# Patient Record
Sex: MALE | Race: ASIAN | Employment: FULL TIME | URBAN - METROPOLITAN AREA
[De-identification: names, ages, dates, MRNs, and addresses within clinical notes are randomized per-mention and may not be internally consistent; named-entity substitution may affect disease eponyms.]

---

## 2019-05-23 ENCOUNTER — OFFICE VISIT (OUTPATIENT)
Dept: FAMILY MEDICINE CLINIC | Facility: CLINIC | Age: 29
End: 2019-05-23
Payer: COMMERCIAL

## 2019-05-23 ENCOUNTER — APPOINTMENT (OUTPATIENT)
Dept: LAB | Age: 29
End: 2019-05-23
Attending: FAMILY MEDICINE
Payer: COMMERCIAL

## 2019-05-23 VITALS
WEIGHT: 267 LBS | SYSTOLIC BLOOD PRESSURE: 130 MMHG | HEIGHT: 74 IN | BODY MASS INDEX: 34.27 KG/M2 | OXYGEN SATURATION: 98 % | DIASTOLIC BLOOD PRESSURE: 80 MMHG | HEART RATE: 55 BPM | RESPIRATION RATE: 18 BRPM

## 2019-05-23 DIAGNOSIS — E55.9 HYPOVITAMINOSIS D: ICD-10-CM

## 2019-05-23 DIAGNOSIS — R00.1 JUNCTIONAL BRADYCARDIA: ICD-10-CM

## 2019-05-23 DIAGNOSIS — Z00.00 ROUTINE GENERAL MEDICAL EXAMINATION AT A HEALTH CARE FACILITY: Primary | ICD-10-CM

## 2019-05-23 DIAGNOSIS — Z00.00 ROUTINE GENERAL MEDICAL EXAMINATION AT A HEALTH CARE FACILITY: ICD-10-CM

## 2019-05-23 DIAGNOSIS — R00.1 BRADYCARDIA WITH 51-60 BEATS PER MINUTE: ICD-10-CM

## 2019-05-23 PROCEDURE — 82607 VITAMIN B-12: CPT | Performed by: FAMILY MEDICINE

## 2019-05-23 PROCEDURE — 84443 ASSAY THYROID STIM HORMONE: CPT | Performed by: FAMILY MEDICINE

## 2019-05-23 PROCEDURE — 99385 PREV VISIT NEW AGE 18-39: CPT | Performed by: FAMILY MEDICINE

## 2019-05-23 PROCEDURE — 84439 ASSAY OF FREE THYROXINE: CPT | Performed by: FAMILY MEDICINE

## 2019-05-23 PROCEDURE — 36415 COLL VENOUS BLD VENIPUNCTURE: CPT | Performed by: FAMILY MEDICINE

## 2019-05-23 PROCEDURE — 82306 VITAMIN D 25 HYDROXY: CPT | Performed by: FAMILY MEDICINE

## 2019-05-23 PROCEDURE — 93000 ELECTROCARDIOGRAM COMPLETE: CPT | Performed by: FAMILY MEDICINE

## 2019-05-23 NOTE — PROGRESS NOTES
HPI:  Here for a physical.    PAST MEDICAL HISTORY:  Past Medical History:   Diagnosis Date   • Hypothyroid    • Hypovitaminosis D      PAST SURGICAL HISTORY:  History reviewed. No pertinent surgical history.   MEDICATIONS:    Current Outpatient Medications file    Social History Narrative      Not on file      REVIEW OF SYSTEMS:  GENERAL: Feeling generally well. EYES: No complaints of diplopia or blurred vision. EARS: No complaints of tinnitus or decreased hearing acuity.   CARDIOVASCULAR: No complaints of lymphadenopathy. No thyromegaly or lesions palpated. CARDIOVASCULAR: regular rhythm. bradycardia NL S1 and S2, no murmurs. Bilateral carotids without bruit. No abdominal bruits auscultated. Bilateral dorsalis pedis and posterior tibial pulses 2+/4+.  No e Patient understands to contact office if unable to do so.

## 2020-04-14 ENCOUNTER — E-VISIT (OUTPATIENT)
Dept: FAMILY MEDICINE CLINIC | Facility: CLINIC | Age: 30
End: 2020-04-14

## 2020-04-14 DIAGNOSIS — J45.21 MILD INTERMITTENT ASTHMA WITH EXACERBATION: Primary | ICD-10-CM

## 2020-04-14 PROCEDURE — 99422 OL DIG E/M SVC 11-20 MIN: CPT | Performed by: NURSE PRACTITIONER

## 2020-04-16 RX ORDER — METHYLPREDNISOLONE 4 MG/1
TABLET ORAL
Qty: 21 TABLET | Refills: 0 | Status: SHIPPED | OUTPATIENT
Start: 2020-04-16 | End: 2021-01-29 | Stop reason: ALTCHOICE

## 2020-04-16 RX ORDER — ALBUTEROL SULFATE 90 UG/1
AEROSOL, METERED RESPIRATORY (INHALATION)
Qty: 1 INHALER | Refills: 0 | Status: SHIPPED | OUTPATIENT
Start: 2020-04-16 | End: 2021-01-29 | Stop reason: ALTCHOICE

## 2020-04-16 NOTE — PROGRESS NOTES
Spoke with patient by phone. Pt with sx for 2 weeks. After jogging outdoors, he developed some chills, nasal gen, and felt weak 2 weeks ago. Lasted 1 day. Pt has had 'tickle cough in his throat' with a sensation that he has phlegm, but does not.   For

## 2020-04-17 ENCOUNTER — PATIENT MESSAGE (OUTPATIENT)
Dept: FAMILY MEDICINE CLINIC | Facility: CLINIC | Age: 30
End: 2020-04-17

## 2021-01-24 ENCOUNTER — HOSPITAL ENCOUNTER (OUTPATIENT)
Age: 31
Discharge: HOME | End: 2021-01-24
Payer: COMMERCIAL

## 2021-01-24 ENCOUNTER — APPOINTMENT (OUTPATIENT)
Dept: GENERAL RADIOLOGY | Age: 31
End: 2021-01-24
Attending: NURSE PRACTITIONER
Payer: COMMERCIAL

## 2021-01-24 VITALS
SYSTOLIC BLOOD PRESSURE: 114 MMHG | TEMPERATURE: 99 F | RESPIRATION RATE: 18 BRPM | OXYGEN SATURATION: 98 % | HEART RATE: 54 BPM | HEIGHT: 75 IN | WEIGHT: 275 LBS | DIASTOLIC BLOOD PRESSURE: 76 MMHG | BODY MASS INDEX: 34.19 KG/M2

## 2021-01-24 DIAGNOSIS — M67.432 GANGLION CYST OF DORSUM OF LEFT WRIST: ICD-10-CM

## 2021-01-24 DIAGNOSIS — R06.00 DYSPNEA, UNSPECIFIED TYPE: Primary | ICD-10-CM

## 2021-01-24 LAB
#MXD IC: 0.7 X10ˆ3/UL (ref 0.1–1)
ATRIAL RATE: 55 BPM
CREAT BLD-MCNC: 1.1 MG/DL
DDIMER WHOLE BLOOD: <200 NG/ML DDU (ref ?–400)
GLUCOSE BLD-MCNC: 73 MG/DL (ref 70–99)
HCT VFR BLD AUTO: 46.6 %
HGB BLD-MCNC: 15.6 G/DL
ISTAT BUN: 16 MG/DL (ref 7–18)
ISTAT CHLORIDE: 103 MMOL/L (ref 98–112)
ISTAT HEMATOCRIT: 48 %
ISTAT IONIZED CALCIUM FOR CHEM 8: 1.33 MMOL/L (ref 1.12–1.32)
ISTAT POTASSIUM: 4 MMOL/L (ref 3.6–5.1)
ISTAT SODIUM: 141 MMOL/L (ref 136–145)
ISTAT TCO2: 28 MMOL/L (ref 21–32)
LYMPHOCYTES # BLD AUTO: 2.4 X10ˆ3/UL (ref 1–4)
LYMPHOCYTES NFR BLD AUTO: 34.4 %
MCH RBC QN AUTO: 29.2 PG (ref 26–34)
MCHC RBC AUTO-ENTMCNC: 33.5 G/DL (ref 31–37)
MCV RBC AUTO: 87.3 FL (ref 80–100)
MIXED CELL %: 9.8 %
NEUTROPHILS # BLD AUTO: 3.9 X10ˆ3/UL (ref 1.5–7.7)
NEUTROPHILS NFR BLD AUTO: 55.8 %
P AXIS: 70 DEGREES
P-R INTERVAL: 146 MS
PLATELET # BLD AUTO: 223 X10ˆ3/UL (ref 150–450)
Q-T INTERVAL: 406 MS
QRS DURATION: 102 MS
QTC CALCULATION (BEZET): 388 MS
R AXIS: 62 DEGREES
RBC # BLD AUTO: 5.34 X10ˆ6/UL
T AXIS: 37 DEGREES
TROPONIN I BLD-MCNC: <0.02 NG/ML
VENTRICULAR RATE: 55 BPM
WBC # BLD AUTO: 7 X10ˆ3/UL (ref 4–11)

## 2021-01-24 PROCEDURE — 99214 OFFICE O/P EST MOD 30 MIN: CPT | Performed by: NURSE PRACTITIONER

## 2021-01-24 PROCEDURE — 84484 ASSAY OF TROPONIN QUANT: CPT | Performed by: NURSE PRACTITIONER

## 2021-01-24 PROCEDURE — 80047 BASIC METABLC PNL IONIZED CA: CPT | Performed by: NURSE PRACTITIONER

## 2021-01-24 PROCEDURE — 85025 COMPLETE CBC W/AUTO DIFF WBC: CPT | Performed by: NURSE PRACTITIONER

## 2021-01-24 PROCEDURE — 71046 X-RAY EXAM CHEST 2 VIEWS: CPT | Performed by: NURSE PRACTITIONER

## 2021-01-24 PROCEDURE — 36415 COLL VENOUS BLD VENIPUNCTURE: CPT | Performed by: NURSE PRACTITIONER

## 2021-01-24 PROCEDURE — 85378 FIBRIN DEGRADE SEMIQUANT: CPT | Performed by: NURSE PRACTITIONER

## 2021-01-24 PROCEDURE — 93000 ELECTROCARDIOGRAM COMPLETE: CPT | Performed by: NURSE PRACTITIONER

## 2021-01-24 RX ORDER — ALBUTEROL SULFATE 90 UG/1
2 AEROSOL, METERED RESPIRATORY (INHALATION) EVERY 4 HOURS PRN
Qty: 1 INHALER | Refills: 0 | Status: SHIPPED | OUTPATIENT
Start: 2021-01-24 | End: 2021-02-23

## 2021-01-24 RX ORDER — METHYLPREDNISOLONE 4 MG/1
TABLET ORAL
Qty: 1 PACKAGE | Refills: 0 | Status: SHIPPED | OUTPATIENT
Start: 2021-01-24 | End: 2021-05-21 | Stop reason: ALTCHOICE

## 2021-01-24 NOTE — ED INITIAL ASSESSMENT (HPI)
C/O 3 day hx of feeling some shortness of breath while at rest, making him feel like he has to take a deeper breath. He denies shortness of breath with activity, just when he sits idly. Denies chest pain with deep inspiration. Denies fever or chills.   R

## 2021-01-24 NOTE — ED PROVIDER NOTES
Patient Seen in: [facility]mediate [address]      History   Patient presents with:  Difficulty Breathing  Arm or Hand Injury    Stated Complaint: shortness of breath x 3 days    HPI/Subjective:   29-year-old male presents the immediate care for short Ht 190.5 cm (6' 3\")   Wt 124.7 kg   SpO2 98%   BMI 34.37 kg/m²         Physical Exam  Vitals signs and nursing note reviewed. Constitutional:       General: He is not in acute distress. Appearance: Normal appearance. He is not ill-appearing.    HENT: 01/24/2021         HEART Score: 1        Risk of adverse cardiac event is 0.9-1.7%              Xr Chest Pa + Lat Chest (cpt=71046)    Result Date: 1/24/2021  PROCEDURE:  XR CHEST PA + LAT CHEST (CPT=71046)  INDICATIONS:  shortness of breath x 3 days  COMP or admission on an emergency basis. Comprehensive verbal and written discharge and follow-up instructions were provided to help prevent relapse or worsening. I discussed the case with the patient and they had no questions, complaints, or concerns.   Melissa

## 2021-01-29 ENCOUNTER — LAB ENCOUNTER (OUTPATIENT)
Dept: LAB | Age: 31
End: 2021-01-29
Attending: FAMILY MEDICINE
Payer: COMMERCIAL

## 2021-01-29 ENCOUNTER — OFFICE VISIT (OUTPATIENT)
Dept: FAMILY MEDICINE CLINIC | Facility: CLINIC | Age: 31
End: 2021-01-29
Payer: COMMERCIAL

## 2021-01-29 VITALS
HEIGHT: 74.5 IN | RESPIRATION RATE: 18 BRPM | DIASTOLIC BLOOD PRESSURE: 80 MMHG | OXYGEN SATURATION: 98 % | SYSTOLIC BLOOD PRESSURE: 120 MMHG | BODY MASS INDEX: 36.44 KG/M2 | HEART RATE: 51 BPM | WEIGHT: 287 LBS

## 2021-01-29 DIAGNOSIS — M67.432 GANGLION CYST OF VOLAR ASPECT OF LEFT WRIST: ICD-10-CM

## 2021-01-29 DIAGNOSIS — E03.9 HYPOTHYROIDISM, UNSPECIFIED TYPE: Primary | ICD-10-CM

## 2021-01-29 DIAGNOSIS — Z13.220 SCREENING, LIPID: ICD-10-CM

## 2021-01-29 DIAGNOSIS — E55.9 HYPOVITAMINOSIS D: ICD-10-CM

## 2021-01-29 DIAGNOSIS — E03.9 HYPOTHYROIDISM, UNSPECIFIED TYPE: ICD-10-CM

## 2021-01-29 LAB
CHOLEST SMN-MCNC: 204 MG/DL (ref ?–200)
HDLC SERPL-MCNC: 49 MG/DL (ref 40–59)
LDLC SERPL CALC-MCNC: 140 MG/DL (ref ?–100)
NONHDLC SERPL-MCNC: 155 MG/DL (ref ?–130)
PATIENT FASTING Y/N/NP: YES
T4 FREE SERPL-MCNC: 1 NG/DL (ref 0.8–1.7)
TRIGL SERPL-MCNC: 76 MG/DL (ref 30–149)
TSI SER-ACNC: 4.68 MIU/ML (ref 0.36–3.74)
VIT D+METAB SERPL-MCNC: 12.7 NG/ML (ref 30–100)
VLDLC SERPL CALC-MCNC: 15 MG/DL (ref 0–30)

## 2021-01-29 PROCEDURE — 84443 ASSAY THYROID STIM HORMONE: CPT

## 2021-01-29 PROCEDURE — 3008F BODY MASS INDEX DOCD: CPT | Performed by: FAMILY MEDICINE

## 2021-01-29 PROCEDURE — 3079F DIAST BP 80-89 MM HG: CPT | Performed by: FAMILY MEDICINE

## 2021-01-29 PROCEDURE — 3074F SYST BP LT 130 MM HG: CPT | Performed by: FAMILY MEDICINE

## 2021-01-29 PROCEDURE — 36415 COLL VENOUS BLD VENIPUNCTURE: CPT

## 2021-01-29 PROCEDURE — 84439 ASSAY OF FREE THYROXINE: CPT

## 2021-01-29 PROCEDURE — 80061 LIPID PANEL: CPT

## 2021-01-29 PROCEDURE — 82306 VITAMIN D 25 HYDROXY: CPT

## 2021-01-29 PROCEDURE — 99214 OFFICE O/P EST MOD 30 MIN: CPT | Performed by: FAMILY MEDICINE

## 2021-01-29 NOTE — PROGRESS NOTES
Has had several episodes of shortness of breath each time resolved with methylprednisolone and albuterol. He did have asthma as a child. Most recently was in the urgent care in the last week or so. Feeling better.     Has been on levothyroxine 100 mcg wh Hypovitaminosis D    - VITAMIN D, 25-HYDROXY; Future    3. Screening, lipid    - LIPID PANEL; Future    4.  Ganglion cyst of volar aspect of left wrist    - ORTHOPEDIC - INTERNAL         If this note is coded by time based on the Office/Outpatient Evaluatio

## 2021-01-30 RX ORDER — LEVOTHYROXINE SODIUM 0.03 MG/1
25 TABLET ORAL
Qty: 30 TABLET | Refills: 2 | Status: SHIPPED | OUTPATIENT
Start: 2021-01-30 | End: 2021-03-22

## 2021-01-30 RX ORDER — ERGOCALCIFEROL 1.25 MG/1
50000 CAPSULE ORAL
Qty: 12 CAPSULE | Refills: 0 | Status: SHIPPED | OUTPATIENT
Start: 2021-01-30 | End: 2021-05-21 | Stop reason: ALTCHOICE

## 2021-01-31 ENCOUNTER — PATIENT MESSAGE (OUTPATIENT)
Dept: FAMILY MEDICINE CLINIC | Facility: CLINIC | Age: 31
End: 2021-01-31

## 2021-02-01 NOTE — TELEPHONE ENCOUNTER
From: Laya Chun  To: Sabrina Cee MD  Sent: 1/31/2021 12:30 AM CST  Subject: Test Results Question    Hi Doctor,  Did you already prescribe Vitamin D and New dosage of thyroid?

## 2021-03-22 RX ORDER — LEVOTHYROXINE SODIUM 0.03 MG/1
25 TABLET ORAL
Qty: 30 TABLET | Refills: 2 | Status: SHIPPED | OUTPATIENT
Start: 2021-03-22 | End: 2021-04-23

## 2021-04-17 DIAGNOSIS — E55.9 HYPOVITAMINOSIS D: Primary | ICD-10-CM

## 2021-04-17 DIAGNOSIS — E03.9 HYPOTHYROIDISM, UNSPECIFIED TYPE: ICD-10-CM

## 2021-04-19 ENCOUNTER — PATIENT MESSAGE (OUTPATIENT)
Dept: FAMILY MEDICINE CLINIC | Facility: CLINIC | Age: 31
End: 2021-04-19

## 2021-04-19 RX ORDER — ERGOCALCIFEROL 1.25 MG/1
50000 CAPSULE ORAL
Qty: 12 CAPSULE | Refills: 0 | OUTPATIENT
Start: 2021-04-19

## 2021-04-23 ENCOUNTER — PATIENT MESSAGE (OUTPATIENT)
Dept: FAMILY MEDICINE CLINIC | Facility: CLINIC | Age: 31
End: 2021-04-23

## 2021-04-23 ENCOUNTER — LABORATORY ENCOUNTER (OUTPATIENT)
Dept: LAB | Age: 31
End: 2021-04-23
Attending: FAMILY MEDICINE
Payer: COMMERCIAL

## 2021-04-23 DIAGNOSIS — E03.9 HYPOTHYROIDISM, UNSPECIFIED TYPE: ICD-10-CM

## 2021-04-23 PROCEDURE — 82306 VITAMIN D 25 HYDROXY: CPT | Performed by: FAMILY MEDICINE

## 2021-04-23 PROCEDURE — 84443 ASSAY THYROID STIM HORMONE: CPT | Performed by: FAMILY MEDICINE

## 2021-04-23 PROCEDURE — 84439 ASSAY OF FREE THYROXINE: CPT | Performed by: FAMILY MEDICINE

## 2021-04-23 RX ORDER — LEVOTHYROXINE SODIUM 0.05 MG/1
50 TABLET ORAL
Qty: 30 TABLET | Refills: 2 | Status: SHIPPED | OUTPATIENT
Start: 2021-04-23 | End: 2021-06-24

## 2021-04-26 NOTE — TELEPHONE ENCOUNTER
Pt finished Vitamin D 50,000 x 12 weeks and just had lab work done on 4/23/21. Vitamin D level was 43. 9. Should she continue with OTC? Please advise.

## 2021-04-26 NOTE — TELEPHONE ENCOUNTER
From: [facility]  To: Cesar Horton MD  Sent: 4/23/2021 4:08 PM CDT  Subject: Test Results Question    I have a question about TSH+FREE T4 resulted on 4/23/21, 3:27 PM.    Shall i continue using Vitamin D tablet once every week?    Also, For T

## 2021-05-14 ENCOUNTER — OFFICE VISIT (OUTPATIENT)
Dept: ORTHOPEDICS CLINIC | Facility: CLINIC | Age: 31
End: 2021-05-14
Payer: COMMERCIAL

## 2021-05-14 VITALS — OXYGEN SATURATION: 100 % | HEART RATE: 70 BPM

## 2021-05-14 DIAGNOSIS — M67.432 GANGLION OF LEFT WRIST: Primary | ICD-10-CM

## 2021-05-14 PROCEDURE — 99202 OFFICE O/P NEW SF 15 MIN: CPT | Performed by: ORTHOPAEDIC SURGERY

## 2021-05-14 NOTE — H&P
[facility] Note    CC: Left dorsal ganglion cyst    HPI: This 31year old right-hand-dominant male presents with left dorsal ganglion cyst that was first noticed in January of this year. He has mild pain associated.   Occasionally he will feel twinge Genitourinary: Negative for dysuria, frequency and urgency. Musculoskeletal: Negative for myalgias, neck pain and neck stiffness. Skin: Negative for color change, rash and wound. Allergic/Immunologic: Negative for immunocompromised state.    Neurolog Luis Daniel Ricci MD  Hand, Wrist, & Elbow Surgery  [facility]  [facility] [address] [facility], [address] [place] [address]. org  [email or web address]  t: [phone]  f: [phone]

## 2021-05-21 ENCOUNTER — OFFICE VISIT (OUTPATIENT)
Dept: FAMILY MEDICINE CLINIC | Facility: CLINIC | Age: 31
End: 2021-05-21
Payer: COMMERCIAL

## 2021-05-21 VITALS
RESPIRATION RATE: 18 BRPM | HEART RATE: 77 BPM | OXYGEN SATURATION: 98 % | WEIGHT: 277 LBS | BODY MASS INDEX: 35.17 KG/M2 | DIASTOLIC BLOOD PRESSURE: 80 MMHG | HEIGHT: 74.5 IN | SYSTOLIC BLOOD PRESSURE: 130 MMHG

## 2021-05-21 DIAGNOSIS — S86.112A STRAIN OF GASTROCNEMIUS MUSCLE OF LEFT LOWER EXTREMITY, INITIAL ENCOUNTER: Primary | ICD-10-CM

## 2021-05-21 PROCEDURE — 99213 OFFICE O/P EST LOW 20 MIN: CPT | Performed by: FAMILY MEDICINE

## 2021-05-21 PROCEDURE — 3008F BODY MASS INDEX DOCD: CPT | Performed by: FAMILY MEDICINE

## 2021-05-21 PROCEDURE — 3079F DIAST BP 80-89 MM HG: CPT | Performed by: FAMILY MEDICINE

## 2021-05-21 PROCEDURE — 3075F SYST BP GE 130 - 139MM HG: CPT | Performed by: FAMILY MEDICINE

## 2021-05-21 RX ORDER — NABUMETONE 500 MG/1
500 TABLET, FILM COATED ORAL 2 TIMES DAILY
Qty: 60 TABLET | Refills: 0 | Status: SHIPPED | OUTPATIENT
Start: 2021-05-21 | End: 2021-06-03

## 2021-05-21 NOTE — PATIENT INSTRUCTIONS
I would like you to use the anti-inflammatory twice a day for 2 weeks. Then you may use the leftovers as needed. Ice the leg after cricket matches or other workouts that aggravate the muscle.     Try to sit with the feet on the floor, do not fold the le

## 2021-05-21 NOTE — PROGRESS NOTES
3 to 4 months of pain in the left anterior lateral calf. He is a bowler for cricket. He also works out every other day. There is a lot of jumping involved. He tends to sit with his left leg folded under his right. No radiation down the leg.   No numbne leftovers as needed. Ice the area after each workout including cricket matches. Try to eliminate other stressors on this muscle so that he can continue to play cricket. Try to decrease the jumping and his other workouts.   Do not sit with the leg folded

## 2021-06-02 ENCOUNTER — PATIENT MESSAGE (OUTPATIENT)
Dept: FAMILY MEDICINE CLINIC | Facility: CLINIC | Age: 31
End: 2021-06-02

## 2021-06-02 DIAGNOSIS — S86.112A STRAIN OF GASTROCNEMIUS MUSCLE OF LEFT LOWER EXTREMITY, INITIAL ENCOUNTER: Primary | ICD-10-CM

## 2021-06-03 NOTE — TELEPHONE ENCOUNTER
From: [facility]  To: Berna Garsia MD  Sent: 6/2/2021 9:43 PM CDT  Subject: Prescription Question    Hi Doctor,    I was prescribed to used Nabumetone.     After few days of using it, i experienced prickly heat and itching on chest and back /

## 2021-06-03 NOTE — TELEPHONE ENCOUNTER
Have him stop the medication. Yes added to the allergy list with the side effect of rash. Have him continue Zyrtec 48 more hours and then discontinue. Next week we will need to decide on an alternative for treatment.

## 2021-06-03 NOTE — TELEPHONE ENCOUNTER
Dr. Shanika Fiore, please see adverse reaction to Nabumetone. Please advise on id patient should continue medication?  Add to allergy list?

## 2021-06-08 RX ORDER — TRAMADOL HYDROCHLORIDE 50 MG/1
50 TABLET ORAL EVERY 6 HOURS PRN
Qty: 30 TABLET | Refills: 1 | Status: SHIPPED | OUTPATIENT
Start: 2021-06-08 | End: 2021-07-01

## 2021-06-08 NOTE — TELEPHONE ENCOUNTER
Dr. Sargent, would you like to rx something for pain in place of the Nabumentone pt had to dc? Or appt?

## 2021-06-15 RX ORDER — LEVOTHYROXINE SODIUM 0.03 MG/1
25 TABLET ORAL
Qty: 90 TABLET | Refills: 0 | Status: SHIPPED | OUTPATIENT
Start: 2021-06-15 | End: 2021-06-24 | Stop reason: DRUGHIGH

## 2021-06-16 ENCOUNTER — TELEPHONE (OUTPATIENT)
Dept: FAMILY MEDICINE CLINIC | Facility: CLINIC | Age: 31
End: 2021-06-16

## 2021-06-16 NOTE — TELEPHONE ENCOUNTER
Pt had an appt in January for an exam regarding some SOB. He said this visit was intended to be a physical and the labs and testing he had done are not being covered 100%.   Pls call to discuss

## 2021-06-18 NOTE — TELEPHONE ENCOUNTER
Pt aware, Dr. Fredrick Damon not changing code to prev. Pt seen for hypothyroidism - TSH lab and vit D def - vit D lab. Lipid panel was entered as screening code.

## 2021-06-23 DIAGNOSIS — S86.112A STRAIN OF GASTROCNEMIUS MUSCLE OF LEFT LOWER EXTREMITY, INITIAL ENCOUNTER: ICD-10-CM

## 2021-06-23 RX ORDER — LEVOTHYROXINE SODIUM 0.05 MG/1
50 TABLET ORAL
Qty: 30 TABLET | Refills: 2 | Status: CANCELLED | OUTPATIENT
Start: 2021-06-23 | End: 2021-09-21

## 2021-06-23 RX ORDER — NABUMETONE 500 MG/1
TABLET, FILM COATED ORAL
Qty: 60 TABLET | Refills: 0 | OUTPATIENT
Start: 2021-06-23

## 2021-06-24 RX ORDER — LEVOTHYROXINE SODIUM 0.05 MG/1
50 TABLET ORAL
Qty: 30 TABLET | Refills: 2 | Status: CANCELLED | OUTPATIENT
Start: 2021-06-24 | End: 2021-09-22

## 2021-06-24 NOTE — TELEPHONE ENCOUNTER
Rx Request  Levothyroxine Sodium 50 MCG Oral Tab    Disp:    30                R: 2    Last Refilled: 04/23/2021    Last Visit: 05/21/2021    Protocol Passed?  Yes[  ]       No[ x ]

## 2021-06-24 NOTE — TELEPHONE ENCOUNTER
Rx Request  Levothyroxine Sodium 50 MCG Oral Tab    Disp:     30               R: 2    Last Refilled: 04/23/2021    Last Visit: 05/21/2021    Protocol Passed?  Yes[  ]       No[ x ]  \

## 2021-07-01 RX ORDER — TRAMADOL HYDROCHLORIDE 50 MG/1
50 TABLET ORAL EVERY 6 HOURS PRN
Qty: 30 TABLET | Refills: 1 | Status: SHIPPED | OUTPATIENT
Start: 2021-07-01 | End: 2021-07-02 | Stop reason: ALTCHOICE

## 2021-07-01 NOTE — TELEPHONE ENCOUNTER
Ask him to see Evette Chatman, the sports medicine orthopedist on [address].   May need to consider us or MRI as the pain is now over 4 months

## 2021-07-02 ENCOUNTER — OFFICE VISIT (OUTPATIENT)
Dept: INTERNAL MEDICINE CLINIC | Facility: CLINIC | Age: 31
End: 2021-07-02
Payer: COMMERCIAL

## 2021-07-02 ENCOUNTER — LAB ENCOUNTER (OUTPATIENT)
Dept: LAB | Age: 31
End: 2021-07-02
Attending: FAMILY MEDICINE
Payer: COMMERCIAL

## 2021-07-02 ENCOUNTER — HOSPITAL ENCOUNTER (OUTPATIENT)
Dept: GENERAL RADIOLOGY | Age: 31
Discharge: HOME | End: 2021-07-02
Attending: FAMILY MEDICINE
Payer: COMMERCIAL

## 2021-07-02 VITALS
HEIGHT: 75 IN | RESPIRATION RATE: 16 BRPM | OXYGEN SATURATION: 98 % | HEART RATE: 68 BPM | WEIGHT: 280 LBS | TEMPERATURE: 98 F | BODY MASS INDEX: 34.82 KG/M2 | SYSTOLIC BLOOD PRESSURE: 128 MMHG | DIASTOLIC BLOOD PRESSURE: 72 MMHG

## 2021-07-02 DIAGNOSIS — M22.2X2 PATELLOFEMORAL PAIN SYNDROME OF LEFT KNEE: ICD-10-CM

## 2021-07-02 DIAGNOSIS — G89.29 CHRONIC PAIN OF LEFT KNEE: ICD-10-CM

## 2021-07-02 DIAGNOSIS — M25.562 CHRONIC PAIN OF LEFT KNEE: Primary | ICD-10-CM

## 2021-07-02 DIAGNOSIS — G89.29 CHRONIC PAIN OF LEFT KNEE: Primary | ICD-10-CM

## 2021-07-02 DIAGNOSIS — E03.9 HYPOTHYROIDISM, UNSPECIFIED TYPE: ICD-10-CM

## 2021-07-02 DIAGNOSIS — M25.562 CHRONIC PAIN OF LEFT KNEE: ICD-10-CM

## 2021-07-02 DIAGNOSIS — S86.912A MUSCLE STRAIN OF LEFT LOWER LEG, INITIAL ENCOUNTER: ICD-10-CM

## 2021-07-02 LAB
T4 FREE SERPL-MCNC: 0.9 NG/DL (ref 0.8–1.7)
TSI SER-ACNC: 4.1 MIU/ML (ref 0.36–3.74)

## 2021-07-02 PROCEDURE — 3078F DIAST BP <80 MM HG: CPT | Performed by: FAMILY MEDICINE

## 2021-07-02 PROCEDURE — 84439 ASSAY OF FREE THYROXINE: CPT | Performed by: FAMILY MEDICINE

## 2021-07-02 PROCEDURE — 84443 ASSAY THYROID STIM HORMONE: CPT | Performed by: FAMILY MEDICINE

## 2021-07-02 PROCEDURE — 73564 X-RAY EXAM KNEE 4 OR MORE: CPT | Performed by: FAMILY MEDICINE

## 2021-07-02 PROCEDURE — 99203 OFFICE O/P NEW LOW 30 MIN: CPT | Performed by: FAMILY MEDICINE

## 2021-07-02 PROCEDURE — 3008F BODY MASS INDEX DOCD: CPT | Performed by: FAMILY MEDICINE

## 2021-07-02 PROCEDURE — 3074F SYST BP LT 130 MM HG: CPT | Performed by: FAMILY MEDICINE

## 2021-07-02 NOTE — PROGRESS NOTES
Sports Medicine Patient Note    CC: Patient presents with:  Musculoskeletal Problem: RG rm 2 New ortho pt, left knee/ shin pain x 3-4 months      HPI: Comfort Flowers is 31year old M who presents for chronic intermittent left knee pain that has bee vision  HEENT: denies headache  LUNGS: denies shortness of breath with exertion  CARDIOVASCULAR: denies chest pain on exertion  GI: no nausea or abdominal pain  NEURO: denies numbness, tingling      Physical Exam:    /72   Pulse 68   Temp 98.1 °F (36 activity modifications and additional analgesia with cryotherapy, trial of Aspercreme or Salonpas. We will avoid oral and topical anti-inflammatories given his possible allergic reaction to nabumetone. He will follow-up with me after physical therapy.

## 2021-07-13 ENCOUNTER — TELEPHONE (OUTPATIENT)
Dept: PHYSICAL THERAPY | Facility: HOSPITAL | Age: 31
End: 2021-07-13

## 2021-07-14 ENCOUNTER — APPOINTMENT (OUTPATIENT)
Dept: PHYSICAL THERAPY | Age: 31
End: 2021-07-14
Attending: FAMILY MEDICINE
Payer: COMMERCIAL

## 2021-07-16 ENCOUNTER — APPOINTMENT (OUTPATIENT)
Dept: PHYSICAL THERAPY | Age: 31
End: 2021-07-16
Attending: FAMILY MEDICINE
Payer: COMMERCIAL

## 2021-07-16 ENCOUNTER — TELEPHONE (OUTPATIENT)
Dept: PHYSICAL THERAPY | Age: 31
End: 2021-07-16

## 2021-07-20 ENCOUNTER — APPOINTMENT (OUTPATIENT)
Dept: PHYSICAL THERAPY | Age: 31
End: 2021-07-20
Attending: FAMILY MEDICINE
Payer: COMMERCIAL

## 2021-07-20 ENCOUNTER — TELEPHONE (OUTPATIENT)
Dept: PHYSICAL THERAPY | Age: 31
End: 2021-07-20

## 2021-07-23 ENCOUNTER — APPOINTMENT (OUTPATIENT)
Dept: PHYSICAL THERAPY | Age: 31
End: 2021-07-23
Attending: FAMILY MEDICINE
Payer: COMMERCIAL

## 2021-07-27 ENCOUNTER — TELEPHONE (OUTPATIENT)
Dept: PHYSICAL THERAPY | Facility: HOSPITAL | Age: 31
End: 2021-07-27

## 2021-07-27 ENCOUNTER — APPOINTMENT (OUTPATIENT)
Dept: PHYSICAL THERAPY | Age: 31
End: 2021-07-27
Attending: FAMILY MEDICINE
Payer: COMMERCIAL

## 2021-07-30 ENCOUNTER — APPOINTMENT (OUTPATIENT)
Dept: PHYSICAL THERAPY | Age: 31
End: 2021-07-30
Attending: FAMILY MEDICINE
Payer: COMMERCIAL

## 2021-08-03 ENCOUNTER — APPOINTMENT (OUTPATIENT)
Dept: PHYSICAL THERAPY | Age: 31
End: 2021-08-03
Attending: FAMILY MEDICINE
Payer: COMMERCIAL

## 2021-08-06 ENCOUNTER — APPOINTMENT (OUTPATIENT)
Dept: PHYSICAL THERAPY | Age: 31
End: 2021-08-06
Attending: FAMILY MEDICINE
Payer: COMMERCIAL

## 2021-08-10 ENCOUNTER — APPOINTMENT (OUTPATIENT)
Dept: PHYSICAL THERAPY | Age: 31
End: 2021-08-10
Attending: FAMILY MEDICINE
Payer: COMMERCIAL

## 2021-08-23 RX ORDER — LEVOTHYROXINE SODIUM 0.05 MG/1
TABLET ORAL
Qty: 30 TABLET | Refills: 0 | OUTPATIENT
Start: 2021-08-23

## 2021-08-23 NOTE — TELEPHONE ENCOUNTER
Patient has never been seen at this office/[facility]     Referred by: Marcelo Roger MD; Medical Diagnosis (from order):    Diagnosis Information      Diagnosis    727.09 (ICD-9-CM) - M76.899 (ICD-10-CM) - Hamstring tendonitis    843.8 (ICD-9-CM) - S76.312A (ICD-10-CM) - Strain of left hamstring muscle, initial encounter    V58.89, 844.1 (ICD-9-CM) - S83.411D (ICD-10-CM) - Sprain of medial collateral ligament of right knee, subsequent encounter    836.0 (ICD-9-CM) - S83.241A (ICD-10-CM) - Tear of medial meniscus of right knee, current, unspecified tear type, initial encounter                Daily Treatment Note    Visit:  2     SUBJECTIVE                                                                                                             Had a little muscle soreness after the last session but not the pain.  My leg feels better.      OBJECTIVE                                                                                                                        TREATMENT                                                                                                                  Therapeutic Exercise:  Name: Billy ZAMORA   Diagnosis:  Left hamstring strain   Precautions: right MCL strain, medial meniscus tear   Today's Exercises:     Passive prone left quad stretch   Passive left hamstring stretch    Hamstring stretch at stairs 30 second hold x 3  bilateral  Supine piriformis stretch 30 second hold x 3  Cues to not overstretch        Manual Therapy:  Soft tissue mobilization left distal adductors, proximal gastroc, distal hamstrings  instrument assisted soft tissue mobilization to distal hamstrings    Neuromuscular Re-Education:  Neuromuscular Re-education to improve quality of motion , improve posture and postural awareness, improve balance, improve coordination  and improve kinesthetic sense:      Left D1 flexion green 2x10  6\" lateral eccentric step down 2x10 bilateral  Lateral lunges 2x10 bilateral  Bilateral dead lift 8# dumbbells 2x10 -  significant cues required for form and posture  Wall taps 1x10 with airex, 1x10 without airex    Skilled input: verbal instruction/cues    Home Exercise Program: Access Code: [email or web address]   URL: [email or web address]   Date: 02/24/2021   Prepared by: Ludwig Yuen      Exercises Supine Piriformis Stretch- 3 sets- 30 seconds hold- 1x daily   LE D1 Flexion in Standing- 10 reps- 2 sets- 1x daily   Standing Hamstring Stretch on Chair- 3 sets- 30 seconds hold- 1x daily   Half Dead Lift with Kettlebell- 8-10 reps- 2 sets- 1x daily   Balance with Wall Taps- 20 reps- 1x daily          ASSESSMENT                                                                                                             The patient is demonstrating independence with current home exercise program. He has tenderness with soft tissue mobilization along left distal hamstring. Decrease left quad flexibility noted.  He required significant cueing to perform dead lifts correctly.  He has difficulty performing exercises with \"soft knees\". He tends to lock his knees in hyperextension.  Wall taps changed from being performed on airex to no airex because patient's form was declining.  No acute symptoms post treatment but reported muscle soreness.        PLAN                                                                                                                           Suggestions for next session as indicated: Progress per plan of care         Therapy procedure time and total treatment time can be found documented on the Time Entry flowsheet

## 2022-01-20 ENCOUNTER — LAB ENCOUNTER (OUTPATIENT)
Dept: LAB | Age: 32
End: 2022-01-20
Attending: FAMILY MEDICINE
Payer: COMMERCIAL

## 2022-01-20 ENCOUNTER — OFFICE VISIT (OUTPATIENT)
Dept: FAMILY MEDICINE CLINIC | Facility: CLINIC | Age: 32
End: 2022-01-20
Payer: COMMERCIAL

## 2022-01-20 VITALS
RESPIRATION RATE: 16 BRPM | DIASTOLIC BLOOD PRESSURE: 74 MMHG | HEIGHT: 75 IN | SYSTOLIC BLOOD PRESSURE: 116 MMHG | WEIGHT: 282.81 LBS | OXYGEN SATURATION: 100 % | BODY MASS INDEX: 35.16 KG/M2 | HEART RATE: 65 BPM

## 2022-01-20 DIAGNOSIS — N06.9 ISOLATED PROTEINURIA WITH MORPHOLOGIC LESION: ICD-10-CM

## 2022-01-20 DIAGNOSIS — E03.9 HYPOTHYROIDISM, UNSPECIFIED TYPE: ICD-10-CM

## 2022-01-20 DIAGNOSIS — Z00.00 ROUTINE GENERAL MEDICAL EXAMINATION AT A HEALTH CARE FACILITY: Primary | ICD-10-CM

## 2022-01-20 DIAGNOSIS — E55.9 HYPOVITAMINOSIS D: ICD-10-CM

## 2022-01-20 DIAGNOSIS — Z00.00 ROUTINE GENERAL MEDICAL EXAMINATION AT A HEALTH CARE FACILITY: ICD-10-CM

## 2022-01-20 LAB
ALBUMIN SERPL-MCNC: 4.3 G/DL (ref 3.4–5)
ALBUMIN/GLOB SERPL: 1.2 {RATIO} (ref 1–2)
ALP LIVER SERPL-CCNC: 76 U/L
ALT SERPL-CCNC: 31 U/L
ANION GAP SERPL CALC-SCNC: 5 MMOL/L (ref 0–18)
AST SERPL-CCNC: 17 U/L (ref 15–37)
BILIRUB SERPL-MCNC: 0.6 MG/DL (ref 0.1–2)
BILIRUB UR QL STRIP.AUTO: NEGATIVE
BUN BLD-MCNC: 17 MG/DL (ref 7–18)
CALCIUM BLD-MCNC: 9.8 MG/DL (ref 8.5–10.1)
CHLORIDE SERPL-SCNC: 103 MMOL/L (ref 98–112)
CLARITY UR REFRACT.AUTO: CLEAR
CO2 SERPL-SCNC: 30 MMOL/L (ref 21–32)
COLOR UR AUTO: COLORLESS
CREAT BLD-MCNC: 1.1 MG/DL
CREAT UR-SCNC: 20.8 MG/DL
EST. AVERAGE GLUCOSE BLD GHB EST-MCNC: 111 MG/DL (ref 68–126)
FASTING STATUS PATIENT QL REPORTED: NO
GLOBULIN PLAS-MCNC: 3.5 G/DL (ref 2.8–4.4)
GLUCOSE BLD-MCNC: 83 MG/DL (ref 70–99)
GLUCOSE UR STRIP.AUTO-MCNC: NEGATIVE MG/DL
HBA1C MFR BLD: 5.5 % (ref ?–5.7)
KETONES UR STRIP.AUTO-MCNC: NEGATIVE MG/DL
LEUKOCYTE ESTERASE UR QL STRIP.AUTO: NEGATIVE
MICROALBUMIN UR-MCNC: 0.68 MG/DL
MICROALBUMIN/CREAT 24H UR-RTO: 32.7 UG/MG (ref ?–30)
NITRITE UR QL STRIP.AUTO: NEGATIVE
OSMOLALITY SERPL CALC.SUM OF ELEC: 287 MOSM/KG (ref 275–295)
PH UR STRIP.AUTO: 6 [PH] (ref 5–8)
POTASSIUM SERPL-SCNC: 4.6 MMOL/L (ref 3.5–5.1)
PROT SERPL-MCNC: 7.8 G/DL (ref 6.4–8.2)
PROT UR STRIP.AUTO-MCNC: NEGATIVE MG/DL
RBC UR QL AUTO: NEGATIVE
SODIUM SERPL-SCNC: 138 MMOL/L (ref 136–145)
SP GR UR STRIP.AUTO: 1 (ref 1–1.03)
T4 FREE SERPL-MCNC: 1.1 NG/DL (ref 0.8–1.7)
TSI SER-ACNC: 3.8 MIU/ML (ref 0.36–3.74)
UROBILINOGEN UR STRIP.AUTO-MCNC: <2 MG/DL
VIT D+METAB SERPL-MCNC: 19.8 NG/ML (ref 30–100)

## 2022-01-20 PROCEDURE — 82570 ASSAY OF URINE CREATININE: CPT | Performed by: FAMILY MEDICINE

## 2022-01-20 PROCEDURE — 82306 VITAMIN D 25 HYDROXY: CPT | Performed by: FAMILY MEDICINE

## 2022-01-20 PROCEDURE — 99395 PREV VISIT EST AGE 18-39: CPT | Performed by: FAMILY MEDICINE

## 2022-01-20 PROCEDURE — 84439 ASSAY OF FREE THYROXINE: CPT | Performed by: FAMILY MEDICINE

## 2022-01-20 PROCEDURE — 82043 UR ALBUMIN QUANTITATIVE: CPT | Performed by: FAMILY MEDICINE

## 2022-01-20 PROCEDURE — 80053 COMPREHEN METABOLIC PANEL: CPT | Performed by: FAMILY MEDICINE

## 2022-01-20 PROCEDURE — 3074F SYST BP LT 130 MM HG: CPT | Performed by: FAMILY MEDICINE

## 2022-01-20 PROCEDURE — 3008F BODY MASS INDEX DOCD: CPT | Performed by: FAMILY MEDICINE

## 2022-01-20 PROCEDURE — 3078F DIAST BP <80 MM HG: CPT | Performed by: FAMILY MEDICINE

## 2022-01-20 PROCEDURE — 84443 ASSAY THYROID STIM HORMONE: CPT | Performed by: FAMILY MEDICINE

## 2022-01-20 PROCEDURE — 83036 HEMOGLOBIN GLYCOSYLATED A1C: CPT | Performed by: FAMILY MEDICINE

## 2022-01-20 PROCEDURE — 81003 URINALYSIS AUTO W/O SCOPE: CPT | Performed by: FAMILY MEDICINE

## 2022-01-20 NOTE — PROGRESS NOTES
Outside labs:  11/18/2021    Protein urine 75 (0-30) positive blood, urine albumin 41 (0-3)    HDL  46  BUN creatinine normal.  Glucose 90    HPI:  Here for a physical.    PAST MEDICAL HISTORY:  Past Medical History:   Diagnosis Date   • Hypothyroid complaints of extreme shortness of breath with activity. No complaints of wheezing. No complaints of  hemoptysis. GASTROINTESTINAL:No complaints of GERD symptoms. Denies hematochezia and melena. No complaints of  any new constipation or diarrhea.   No comp lesions, no discharge. LYMPHATIC: no lymphadenopathy palpated about the anterior and posterior cervical chains, submandibular and supraclavicular areas, axilla, and inguinal areas. EXTREMITIES / MUSCULOSKELETAL: 4 extremities with grossly normal ROM.  Keturah Richard

## 2022-01-21 ENCOUNTER — HOSPITAL ENCOUNTER (OUTPATIENT)
Dept: ULTRASOUND IMAGING | Age: 32
Discharge: HOME | End: 2022-01-21
Attending: FAMILY MEDICINE
Payer: COMMERCIAL

## 2022-01-21 DIAGNOSIS — N06.9 ISOLATED PROTEINURIA WITH MORPHOLOGIC LESION: ICD-10-CM

## 2022-01-21 DIAGNOSIS — R80.9 PROTEINURIA, UNSPECIFIED TYPE: Primary | ICD-10-CM

## 2022-01-21 PROCEDURE — 76775 US EXAM ABDO BACK WALL LIM: CPT | Performed by: FAMILY MEDICINE

## 2022-01-24 ENCOUNTER — PATIENT MESSAGE (OUTPATIENT)
Dept: FAMILY MEDICINE CLINIC | Facility: CLINIC | Age: 32
End: 2022-01-24

## 2022-01-25 NOTE — TELEPHONE ENCOUNTER
From: [facility]  To: Josué Mishra MD  Sent: 1/24/2022 6:31 PM CST  Subject: Question regarding VITAMIN D, 25-HYDROXY    Hi Doctor,  Were you able to send the prescription for Vitamin D?

## 2022-06-14 ENCOUNTER — PATIENT MESSAGE (OUTPATIENT)
Dept: FAMILY MEDICINE CLINIC | Facility: CLINIC | Age: 32
End: 2022-06-14

## 2022-06-14 DIAGNOSIS — E55.9 VITAMIN D DEFICIENCY: Primary | ICD-10-CM

## 2022-06-15 NOTE — TELEPHONE ENCOUNTER
From: [facility]  To: Cristian Smith MD  Sent: 6/14/2022 7:17 PM CDT  Subject: Question regarding VITAMIN D, 25-HYDROXY    Hello, Can you please order VitaminD test for the recheck in 3 months as advised by doctor    I have checked with Lab, they said there was no order for VitaminD

## 2022-06-16 ENCOUNTER — LAB ENCOUNTER (OUTPATIENT)
Dept: LAB | Age: 32
End: 2022-06-16
Attending: FAMILY MEDICINE
Payer: COMMERCIAL

## 2022-06-16 DIAGNOSIS — E55.9 VITAMIN D DEFICIENCY: ICD-10-CM

## 2022-06-16 DIAGNOSIS — Z13.220 SCREENING, LIPID: ICD-10-CM

## 2022-06-16 LAB
CHOLEST SERPL-MCNC: 165 MG/DL (ref ?–200)
FASTING PATIENT LIPID ANSWER: YES
HDLC SERPL-MCNC: 43 MG/DL (ref 40–59)
LDLC SERPL CALC-MCNC: 110 MG/DL (ref ?–100)
NONHDLC SERPL-MCNC: 122 MG/DL (ref ?–130)
TRIGL SERPL-MCNC: 59 MG/DL (ref 30–149)
VIT D+METAB SERPL-MCNC: 43.3 NG/ML (ref 30–100)
VLDLC SERPL CALC-MCNC: 10 MG/DL (ref 0–30)

## 2022-06-16 PROCEDURE — 82306 VITAMIN D 25 HYDROXY: CPT

## 2022-06-16 PROCEDURE — 80061 LIPID PANEL: CPT

## 2022-06-16 PROCEDURE — 36415 COLL VENOUS BLD VENIPUNCTURE: CPT

## 2022-06-29 ENCOUNTER — OFFICE VISIT (OUTPATIENT)
Dept: FAMILY MEDICINE CLINIC | Facility: CLINIC | Age: 32
End: 2022-06-29
Payer: COMMERCIAL

## 2022-06-29 VITALS
HEART RATE: 60 BPM | BODY MASS INDEX: 35.31 KG/M2 | RESPIRATION RATE: 16 BRPM | HEIGHT: 75 IN | DIASTOLIC BLOOD PRESSURE: 68 MMHG | SYSTOLIC BLOOD PRESSURE: 100 MMHG | WEIGHT: 284 LBS | OXYGEN SATURATION: 96 %

## 2022-06-29 DIAGNOSIS — K60.1 CHRONIC ANAL FISSURE: Primary | ICD-10-CM

## 2022-06-29 PROCEDURE — 99213 OFFICE O/P EST LOW 20 MIN: CPT | Performed by: FAMILY MEDICINE

## 2022-06-29 PROCEDURE — 3074F SYST BP LT 130 MM HG: CPT | Performed by: FAMILY MEDICINE

## 2022-06-29 PROCEDURE — 3078F DIAST BP <80 MM HG: CPT | Performed by: FAMILY MEDICINE

## 2022-06-29 PROCEDURE — 3008F BODY MASS INDEX DOCD: CPT | Performed by: FAMILY MEDICINE

## 2022-07-07 ENCOUNTER — TELEPHONE (OUTPATIENT)
Dept: FAMILY MEDICINE CLINIC | Facility: CLINIC | Age: 32
End: 2022-07-07

## 2022-07-19 ENCOUNTER — PATIENT MESSAGE (OUTPATIENT)
Dept: FAMILY MEDICINE CLINIC | Facility: CLINIC | Age: 32
End: 2022-07-19

## 2022-09-12 ENCOUNTER — HOSPITAL ENCOUNTER (OUTPATIENT)
Age: 32
Discharge: HOME | End: 2022-09-12
Payer: COMMERCIAL

## 2022-09-12 VITALS
TEMPERATURE: 97 F | WEIGHT: 280 LBS | OXYGEN SATURATION: 99 % | DIASTOLIC BLOOD PRESSURE: 88 MMHG | BODY MASS INDEX: 35 KG/M2 | HEART RATE: 67 BPM | SYSTOLIC BLOOD PRESSURE: 127 MMHG | RESPIRATION RATE: 20 BRPM

## 2022-09-12 DIAGNOSIS — K62.89 ANAL OR RECTAL PAIN: Primary | ICD-10-CM

## 2022-09-12 PROCEDURE — 99213 OFFICE O/P EST LOW 20 MIN: CPT | Performed by: NURSE PRACTITIONER

## 2022-09-12 RX ORDER — HYDROCORTISONE ACETATE 25 MG/1
25 SUPPOSITORY RECTAL 2 TIMES DAILY
Qty: 14 SUPPOSITORY | Refills: 0 | Status: SHIPPED | OUTPATIENT
Start: 2022-09-12 | End: 2022-09-19

## 2022-09-27 ENCOUNTER — OFFICE VISIT (OUTPATIENT)
Facility: LOCATION | Age: 32
End: 2022-09-27

## 2022-09-27 VITALS — TEMPERATURE: 97 F | HEART RATE: 80 BPM

## 2022-09-27 DIAGNOSIS — L29.0 PRURITUS ANI: ICD-10-CM

## 2022-09-27 DIAGNOSIS — K60.1 CHRONIC POSTERIOR ANAL FISSURE: Primary | ICD-10-CM

## 2022-09-27 DIAGNOSIS — K60.1 CHRONIC ANTERIOR ANAL FISSURE: ICD-10-CM

## 2022-09-27 PROCEDURE — 99203 OFFICE O/P NEW LOW 30 MIN: CPT | Performed by: COLON & RECTAL SURGERY

## 2022-09-28 PROBLEM — K60.1 CHRONIC ANTERIOR ANAL FISSURE: Status: ACTIVE | Noted: 2022-09-28

## 2022-09-28 PROBLEM — L29.0 PRURITUS ANI: Status: ACTIVE | Noted: 2022-09-28

## 2022-09-28 PROBLEM — K60.1 CHRONIC POSTERIOR ANAL FISSURE: Status: ACTIVE | Noted: 2022-09-28

## 2022-09-28 NOTE — PATIENT INSTRUCTIONS
This patient presents in consultation of Dr. Josh Goldman for evaluation of rectal pain and anal fissures. The patient states in 2019, he passed a hard stool, began to have some rectal pain and a small amount of bright red blood per rectum when wiping following bowel movements. He had significant pain with defecation during this time. He states he was diagnosed with a fissure at this time and given a topical ointment, which provided him some relief. The patient states since 2019, he has had anal fissures intermittently. He states he typically has pain for a week or 2 after passing a hard stool, then his symptoms gradually resolve and he is okay for several months. He states more recently, the pain was more severe, so he was seen at an urgent care facility where he was given hydrocortisone suppositories. He states he utilized these for 1 week, and his symptoms resolved. The patient states he notes his fissures are worse when he is eating poorly. He consumes high amount of spicy foods. He also notes his fissure is being worse when he is consuming a higher quantity of alcohol. He states he also has significant perianal pruritus. The patient states he is not constipated, but the first bowel movement he has of the day is typically hard. He states he has 3-4 bowel movements per day. He denies diarrhea. He denies hematochezia, melena or mucus in his stool. He denies narrowing of the stools. He denies abdominal pain or distention. He denies unintentional weight loss. The patient has no significant past medical history. He does not take any blood thinners. He denies a personal history of IBS, Crohn's disease or ulcerative colitis. The patient has had no prior operations. The patient has never had a colonoscopy. The patient denies any first or second-degree relatives with a history of colon cancer/colon polyps.   The patient denies any first or second-degree relatives with a history of uterine cancer. Clinical exam of the rectum is limited to an external exam and digital rectal exam only. He has no evidence of external hemorrhoids, fistulae or abscesses. He does have chronic posterior and anterior anal fissures. Digital rectal exam reveals no palpable masses. Prostate is normal.  Basal tone 4/4 and maximum squeeze pressure 4/4. We will prescribe the patient nitroglycerin at today's visit. I instructed the patient to apply a pea-sized amount of nitroglycerin ointment around the anus 3 times daily. Additionally,  I advised the patient on dietary and hygienic modifications for his perianal pruritus. These include avoiding coffee, cola, chocolate, tea, beer, tomatoes, ketchup, and spicy foods. The patient was also advised to avoid over wiping. They should wipe once with toilet paper, once with Preparation H or Tucks wipes due to the which rosalina, then pat dry with toilet paper. If they must use more than they should have been the shower. They should remain on this diet for at least 1 month to allow the skin to heal.    Lastly, I recommend the patient increase the fiber in his diet and his hydration to aid in softening his stools. The patient expressed understanding with the above plan. He will follow-up with me in 1 month to discuss his symptoms.

## 2022-10-12 ENCOUNTER — TELEPHONE (OUTPATIENT)
Dept: FAMILY MEDICINE CLINIC | Facility: CLINIC | Age: 32
End: 2022-10-12

## 2022-11-01 ENCOUNTER — OFFICE VISIT (OUTPATIENT)
Facility: LOCATION | Age: 32
End: 2022-11-01
Payer: COMMERCIAL

## 2022-11-01 DIAGNOSIS — K60.1 CHRONIC ANTERIOR ANAL FISSURE: ICD-10-CM

## 2022-11-01 DIAGNOSIS — L29.0 PRURITUS ANI: ICD-10-CM

## 2022-11-01 DIAGNOSIS — K60.1 CHRONIC POSTERIOR ANAL FISSURE: Primary | ICD-10-CM

## 2022-11-01 PROCEDURE — 99213 OFFICE O/P EST LOW 20 MIN: CPT | Performed by: COLON & RECTAL SURGERY

## 2022-11-02 PROBLEM — K60.1 CHRONIC POSTERIOR ANAL FISSURE: Status: ACTIVE | Noted: 2022-11-02

## 2022-11-02 NOTE — PATIENT INSTRUCTIONS
The patient presents for a 6-week follow-up regarding his chronic anal fissures and pruritus ani. The patient states he thinks his anal fissures have entirely healed. He has been applying nitroglycerin ointment 3 times daily. He states since his last visit, he had 1 instance of diarrhea, which reaggravated his fissures. These have since healed. The patient denies any other episodes of diarrhea. He denies constipation. He denies hematochezia, melena or mucus in the stools. The patient states he continues to have some pruritus ani, but this has improved. He states previously he would have pruritus 4 hours following bowel movements, but now it lasts 20 to 30 minutes. He has been following the pruritus ani diet strictly. Clinical exam reveals no external hemorrhoids, fissures, fistulae or abscesses. There is a thin layer of epithelium over the posterior and anterior midline anal fissures. His pruritus ani has resolved. Digital rectal exam reveals no palpable masses. Prostate is normal.  Basal tone 4/4 and maximum squeeze pressure 4/4. I instructed the patient to remain on the pruritus ani diet for an additional 2 weeks. He should also continue applying nitroglycerin 3 times daily for 2 weeks. After these 2 weeks, the patient may begin slowly introducing more acidic foods into his diet. The patient expressed understanding with the above plan. I will see him on an as-needed basis in the future.

## 2022-11-17 ENCOUNTER — PATIENT MESSAGE (OUTPATIENT)
Facility: LOCATION | Age: 32
End: 2022-11-17

## 2022-11-17 NOTE — TELEPHONE ENCOUNTER
Called to make appt. Pt wants appt tomorrow because they are going out of town until January. Explained doctor is in [address] on Friday all day. Pt asking if he could be seen at 6:30? - PM?, No AM- explained not one is in the office at that time. Future Appointments   Date Time Provider Jt Melton   1/23/2023 10:30 AM Nitesh Le MD [facility]         ----- Message from Jennifer Reeder [address] [place] [address] sent at 11/17/2022  1:16 PM CST -----  Regarding: FW: No Major Improvements on Itching  Please schedule another visit to see HARPER.   Thanks  ----- Message -----  From: Cesilia Lamb RN  Sent: 11/17/2022  12:25 PM CST  To: Jennifer Toribio PA-C  Subject: FW: No Major Improvements on Itching             LOV 11/1, should she make a another follow up visit?    ----- Message -----  From: Elana Spine  Sent: 11/17/2022  11:55 AM CST  To: [facility] Nurse  Subject: No Major Improvements on Itching                 Hi Doctor,    I have been on Diet for additional 2weeks using Nitroglycerin ointment 3 time a day    I still have itching every now and then, please advise what else i can do    Thanks,  [employer]

## 2023-01-10 ENCOUNTER — OFFICE VISIT (OUTPATIENT)
Facility: LOCATION | Age: 33
End: 2023-01-10
Payer: COMMERCIAL

## 2023-01-10 VITALS — TEMPERATURE: 97 F | HEART RATE: 66 BPM

## 2023-01-10 DIAGNOSIS — K64.2 PROLAPSED INTERNAL HEMORRHOIDS, GRADE 3: ICD-10-CM

## 2023-01-10 DIAGNOSIS — L29.0 PRURITUS ANI: Primary | ICD-10-CM

## 2023-01-10 DIAGNOSIS — K60.1 CHRONIC ANAL FISSURE: ICD-10-CM

## 2023-01-10 PROCEDURE — 99214 OFFICE O/P EST MOD 30 MIN: CPT | Performed by: COLON & RECTAL SURGERY

## 2023-01-10 PROCEDURE — 46600 DIAGNOSTIC ANOSCOPY SPX: CPT | Performed by: COLON & RECTAL SURGERY

## 2023-01-11 NOTE — PATIENT INSTRUCTIONS
This patient is noted to have an anal fissure, advanced pruritus ani, and grade 3 internal hemorrhoids. We gave this patient extensive dietary and hygienic modifications for his pruritus ani. This should help his itching. We have refilled and prescribed a nitroglycerin ointment which she should take 3 times daily. We had a long discussion about surgical management for anal fissures, and potential rubber band therapy for internal hemorrhoids. He will try medical management. We will see him in 1 month. He may require surgical intervention if he has no improvement.

## 2023-01-15 PROBLEM — K64.2 PROLAPSED INTERNAL HEMORRHOIDS, GRADE 3: Status: ACTIVE | Noted: 2023-01-15

## (undated) NOTE — MR AVS SNAPSHOT
After Visit Summary   4/14/2020    Ivonne Clinton    MRN: [id number]           Visit Information     Date & Time  4/14/2020  9:47 PM Provider  [facility] Department  [facility] Dept.  Phone  [phone]      A Get your heart pumping – brisk walking, biking, swimming Even 10 minute increments are effective and add up over the week   2 ½ hours per week – spread out over time Use a saba to keep you motivated   Don’t forget strength training with weights and resist Monday – Friday  8:00 am – 8:00 pm   Saturday – Sunday  8:00 am – 4:00 pm    WALK-IN CARE  Primary Care Providers  Treatment for acute illness   or injury that are   non-life-threatening.   Also available by appointment     Average cost  $70*       [facility]

## (undated) NOTE — LETTER
23    Patient: Xavier Guardado  : 1990 Visit date: 1/10/2023    Dear  Jeremy Jeans, MD    Thank you for referring Xavier Guardado to my practice. Please find my assessment and plan below. Assessment   Posterior midline anal fissure  Grade 3 internal hemorrhoids    Plan   The patient presents for continued care and evaluation regarding his anal fissures and pruritus ani. This has been an ongoing issue for the patient for the last 8 months. He states his anal fissure will heal, then his pruritus becomes more symptomatic, then his pruritus resolves and his fissure recurs. Since meeting me, the patient states he is finished 3 prescriptions of nitroglycerin. He states the nitroglycerin helps, but his fissure has never entirely healed. The patient states he has been following the pruritus ani diet somewhat. He typically eats significantly spicy foods. He states prior to being treated with me, he was eating foods at his place level of 10/10. He states he continues to eat spicy foods but the intensity of the spice is a 3/10. Additionally, he has increased the fiber in his diet and is maintaining adequate hydration. The patient states his fissure typically recurs with bowel movements that require more straining. He describes these bowel movements are soft, but he has difficulty entirely emptying. He states his fissure is also more symptomatic after eating spicy foods. He states generally he has no diarrhea or constipation. He denies blood in his stool, melena or mucus in his stool. He states his perianal skin is fairly moist, so he places a dry gauze the gluteal cleft. Clinical exam of the rectum including anoscopy reveals no external hemorrhoids, fistulae or abscesses. He does have a posterior midline anal fissure and an anal fissure to the right of the midline posteriorly. He has significant pruritus ani.   Anoscopy reveals grade 3 internal hemorrhoids in the right anterior, right posterior and left lateral locations. There is no evidence of proctitis or Crohn's disease. Digital rectal exam reveals no palpable masses. Prostate is normal.  Basal tone 4/4 maximum squeeze pressure 4/4. I explained to the patient that he needs to cut spicy foods out of his diet entirely. I do believe this is why he continues to have pruritus ani symptoms and recurrence of his anal fissures. We will refill the patient's nitroglycerin prescription as needed. We discussed surgical management for the patient's fissures if they do not resolve over this next month after eliminating spicy foods from his diet. I would recommend a subcutaneous lateral internal sphincterotomy for treatment of his fissure. I described the operation to this patient in detail. If in 1 month, the patient continues to have symptoms, then he will call our office to schedule this operation. If he feels his symptoms have resolved, then he requires no further follow-up with me and we will see him on an as-needed basis in the future. All risks, benefits, complications and alternatives to the proposed procedure(s) were fully discussed with the patient. All questions from the patient were answered in detail. A description of the procedure(s) possible outcomes was fully discussed. The patient seemed to understand the conversation and its details. Consent for the procedure(s) was confirmed with the patient.        Sincerely,       Osorio Shannon MD   CC: No Recipients

## (undated) NOTE — LETTER
22    Patient: Missy Martinez  : 1990 Visit date: 2022    Dear  Kayla Hernadez MD    Thank you for referring Missy Martinez to my practice. Please find my assessment and plan below. Assessment   Chronic posterior anal fissure  (primary encounter diagnosis)  Chronic anterior anal fissure  Pruritus ani      Plan   This patient presents in consultation of Dr. Deep Street for evaluation of rectal pain and anal fissures. The patient states in 2019, he passed a hard stool, began to have some rectal pain and a small amount of bright red blood per rectum when wiping following bowel movements. He had significant pain with defecation during this time. He states he was diagnosed with a fissure at this time and given a topical ointment, which provided him some relief. The patient states since 2019, he has had anal fissures intermittently. He states he typically has pain for a week or 2 after passing a hard stool, then his symptoms gradually resolve and he is okay for several months. He states more recently, the pain was more severe, so he was seen at an urgent care facility where he was given hydrocortisone suppositories. He states he utilized these for 1 week, and his symptoms resolved. The patient states he notes his fissures are worse when he is eating poorly. He consumes high amount of spicy foods. He also notes his fissure is being worse when he is consuming a higher quantity of alcohol. He states he also has significant perianal pruritus. The patient states he is not constipated, but the first bowel movement he has of the day is typically hard. He states he has 3-4 bowel movements per day. He denies diarrhea. He denies hematochezia, melena or mucus in his stool. He denies narrowing of the stools. He denies abdominal pain or distention. He denies unintentional weight loss. The patient has no significant past medical history.   He does not take any blood thinners. He denies a personal history of IBS, Crohn's disease or ulcerative colitis. The patient has had no prior operations. The patient has never had a colonoscopy. The patient denies any first or second-degree relatives with a history of colon cancer/colon polyps. The patient denies any first or second-degree relatives with a history of uterine cancer. Clinical exam of the rectum is limited to an external exam and digital rectal exam only. He has no evidence of external hemorrhoids, fistulae or abscesses. He does have chronic posterior and anterior anal fissures. Digital rectal exam reveals no palpable masses. Prostate is normal.  Basal tone 4/4 and maximum squeeze pressure 4/4. We will prescribe the patient nitroglycerin at today's visit. I instructed the patient to apply a pea-sized amount of nitroglycerin ointment around the anus 3 times daily. Additionally,  I advised the patient on dietary and hygienic modifications for his perianal pruritus. These include avoiding coffee, cola, chocolate, tea, beer, tomatoes, ketchup, and spicy foods. The patient was also advised to avoid over wiping. They should wipe once with toilet paper, once with Preparation H or Tucks wipes due to the which rosalina, then pat dry with toilet paper. If they must use more than they should have been the shower. They should remain on this diet for at least 1 month to allow the skin to heal.    Lastly, I recommend the patient increase the fiber in his diet and his hydration to aid in softening his stools. The patient expressed understanding with the above plan. He will follow-up with me in 1 month to discuss his symptoms.       Sincerely,       Junior Alfaro MD   CC: No Recipients

## (undated) NOTE — LETTER
22    Patient: Qian Morillo  : 1990 Visit date: 2022    Dear  Sandro Chavez MD    Thank you for referring Qian Morillo to my practice. Please find my assessment and plan below. Assessment   Chronic posterior anal fissure  (primary encounter diagnosis)  Chronic anterior anal fissure  Pruritus ani    Plan   The patient presents for a 6-week follow-up regarding his chronic anal fissures and pruritus ani. The patient states he thinks his anal fissures have entirely healed. He has been applying nitroglycerin ointment 3 times daily. He states since his last visit, he had 1 instance of diarrhea, which reaggravated his fissures. These have since healed. The patient denies any other episodes of diarrhea. He denies constipation. He denies hematochezia, melena or mucus in the stools. The patient states he continues to have some pruritus ani, but this has improved. He states previously he would have pruritus 4 hours following bowel movements, but now it lasts 20 to 30 minutes. He has been following the pruritus ani diet strictly. Clinical exam reveals no external hemorrhoids, fissures, fistulae or abscesses. There is a thin layer of epithelium over the posterior and anterior midline anal fissures. His pruritus ani has resolved. Digital rectal exam reveals no palpable masses. Prostate is normal.  Basal tone 4/4 and maximum squeeze pressure 4/4. I instructed the patient to remain on the pruritus ani diet for an additional 2 weeks. He should also continue applying nitroglycerin 3 times daily for 2 weeks. After these 2 weeks, the patient may begin slowly introducing more acidic foods into his diet. The patient expressed understanding with the above plan. I will see him on an as-needed basis in the future.   Sincerely,   Chuyita Rosado MD